# Patient Record
Sex: MALE | Race: WHITE | NOT HISPANIC OR LATINO | Employment: FULL TIME | URBAN - METROPOLITAN AREA
[De-identification: names, ages, dates, MRNs, and addresses within clinical notes are randomized per-mention and may not be internally consistent; named-entity substitution may affect disease eponyms.]

---

## 2017-09-22 ENCOUNTER — NON-PROVIDER VISIT (OUTPATIENT)
Dept: OCCUPATIONAL MEDICINE | Facility: CLINIC | Age: 22
End: 2017-09-22

## 2017-09-22 DIAGNOSIS — Z02.1 DRUG SCREENING, PRE-EMPLOYMENT: ICD-10-CM

## 2017-09-22 PROCEDURE — 8907 PR URINE COLLECT ONLY: Performed by: PREVENTIVE MEDICINE

## 2017-09-25 ENCOUNTER — NON-PROVIDER VISIT (OUTPATIENT)
Dept: OCCUPATIONAL MEDICINE | Facility: CLINIC | Age: 22
End: 2017-09-25

## 2017-09-25 PROCEDURE — 8907 PR URINE COLLECT ONLY: Performed by: PREVENTIVE MEDICINE

## 2022-08-11 ENCOUNTER — APPOINTMENT (OUTPATIENT)
Dept: RADIOLOGY | Facility: MEDICAL CENTER | Age: 27
End: 2022-08-11
Attending: EMERGENCY MEDICINE

## 2022-08-11 ENCOUNTER — HOSPITAL ENCOUNTER (EMERGENCY)
Facility: MEDICAL CENTER | Age: 27
End: 2022-08-11
Attending: EMERGENCY MEDICINE

## 2022-08-11 VITALS
OXYGEN SATURATION: 96 % | RESPIRATION RATE: 16 BRPM | DIASTOLIC BLOOD PRESSURE: 71 MMHG | BODY MASS INDEX: 29.41 KG/M2 | TEMPERATURE: 98.4 F | WEIGHT: 254.19 LBS | SYSTOLIC BLOOD PRESSURE: 129 MMHG | HEIGHT: 78 IN | HEART RATE: 65 BPM

## 2022-08-11 DIAGNOSIS — R06.00 DYSPNEA, UNSPECIFIED TYPE: ICD-10-CM

## 2022-08-11 DIAGNOSIS — R07.9 CHEST PAIN, UNSPECIFIED TYPE: ICD-10-CM

## 2022-08-11 LAB
ALBUMIN SERPL BCP-MCNC: 4.7 G/DL (ref 3.2–4.9)
ALBUMIN/GLOB SERPL: 2 G/DL
ALP SERPL-CCNC: 60 U/L (ref 30–99)
ALT SERPL-CCNC: 46 U/L (ref 2–50)
ANION GAP SERPL CALC-SCNC: 12 MMOL/L (ref 7–16)
AST SERPL-CCNC: 30 U/L (ref 12–45)
BASOPHILS # BLD AUTO: 0.4 % (ref 0–1.8)
BASOPHILS # BLD: 0.03 K/UL (ref 0–0.12)
BILIRUB SERPL-MCNC: 1.8 MG/DL (ref 0.1–1.5)
BUN SERPL-MCNC: 15 MG/DL (ref 8–22)
CALCIUM SERPL-MCNC: 9.6 MG/DL (ref 8.5–10.5)
CHLORIDE SERPL-SCNC: 103 MMOL/L (ref 96–112)
CO2 SERPL-SCNC: 21 MMOL/L (ref 20–33)
CREAT SERPL-MCNC: 0.85 MG/DL (ref 0.5–1.4)
EKG IMPRESSION: NORMAL
EOSINOPHIL # BLD AUTO: 0.78 K/UL (ref 0–0.51)
EOSINOPHIL NFR BLD: 10.9 % (ref 0–6.9)
ERYTHROCYTE [DISTWIDTH] IN BLOOD BY AUTOMATED COUNT: 41.1 FL (ref 35.9–50)
GFR SERPLBLD CREATININE-BSD FMLA CKD-EPI: 122 ML/MIN/1.73 M 2
GLOBULIN SER CALC-MCNC: 2.4 G/DL (ref 1.9–3.5)
GLUCOSE BLD STRIP.AUTO-MCNC: 111 MG/DL (ref 65–99)
GLUCOSE SERPL-MCNC: 99 MG/DL (ref 65–99)
HCT VFR BLD AUTO: 47 % (ref 42–52)
HGB BLD-MCNC: 16.3 G/DL (ref 14–18)
IMM GRANULOCYTES # BLD AUTO: 0.03 K/UL (ref 0–0.11)
IMM GRANULOCYTES NFR BLD AUTO: 0.4 % (ref 0–0.9)
LYMPHOCYTES # BLD AUTO: 1.97 K/UL (ref 1–4.8)
LYMPHOCYTES NFR BLD: 27.6 % (ref 22–41)
MCH RBC QN AUTO: 31.4 PG (ref 27–33)
MCHC RBC AUTO-ENTMCNC: 34.7 G/DL (ref 33.7–35.3)
MCV RBC AUTO: 90.6 FL (ref 81.4–97.8)
MONOCYTES # BLD AUTO: 0.75 K/UL (ref 0–0.85)
MONOCYTES NFR BLD AUTO: 10.5 % (ref 0–13.4)
NEUTROPHILS # BLD AUTO: 3.57 K/UL (ref 1.82–7.42)
NEUTROPHILS NFR BLD: 50.2 % (ref 44–72)
NRBC # BLD AUTO: 0 K/UL
NRBC BLD-RTO: 0 /100 WBC
PLATELET # BLD AUTO: 202 K/UL (ref 164–446)
PMV BLD AUTO: 10.5 FL (ref 9–12.9)
POTASSIUM SERPL-SCNC: 4.2 MMOL/L (ref 3.6–5.5)
PROT SERPL-MCNC: 7.1 G/DL (ref 6–8.2)
RBC # BLD AUTO: 5.19 M/UL (ref 4.7–6.1)
SODIUM SERPL-SCNC: 136 MMOL/L (ref 135–145)
TROPONIN T SERPL-MCNC: <6 NG/L (ref 6–19)
WBC # BLD AUTO: 7.1 K/UL (ref 4.8–10.8)

## 2022-08-11 PROCEDURE — 85025 COMPLETE CBC W/AUTO DIFF WBC: CPT

## 2022-08-11 PROCEDURE — 80053 COMPREHEN METABOLIC PANEL: CPT

## 2022-08-11 PROCEDURE — 71045 X-RAY EXAM CHEST 1 VIEW: CPT

## 2022-08-11 PROCEDURE — 99283 EMERGENCY DEPT VISIT LOW MDM: CPT

## 2022-08-11 PROCEDURE — 93005 ELECTROCARDIOGRAM TRACING: CPT | Performed by: EMERGENCY MEDICINE

## 2022-08-11 PROCEDURE — 93005 ELECTROCARDIOGRAM TRACING: CPT

## 2022-08-11 PROCEDURE — 84484 ASSAY OF TROPONIN QUANT: CPT

## 2022-08-11 PROCEDURE — 82962 GLUCOSE BLOOD TEST: CPT

## 2022-08-11 PROCEDURE — 36415 COLL VENOUS BLD VENIPUNCTURE: CPT

## 2022-08-11 NOTE — ED NOTES
Discharge instructions verbalized to pt. Pt verbalized understanding. Pt discharged ambulatory. Questions answered. No further concerns at this time.

## 2022-08-11 NOTE — ED TRIAGE NOTES
Konrad Carrillo  27 y.o. male  Chief Complaint   Patient presents with    Shortness of Breath     Restless sleep x 1 week. Last night unable to fall asleep due to SOB with dizziness and panicked feeling every time patient was about to fall asleep. Seen for similar episode 12/26/2022. Pulmonology referral sent for possible MILA. Patient did not follow up with pulmonology.   Patient has been feeling tightness in his chest with dizziness, then the need to take a deep breath. NIH 0, GCS 15, FSBG 111, EKG performed.        Pt ambulatory to triage with steady gait for above complaint. Patient reports he took a hydroxyzine this am that was supplied by a friend because he thought he was having a panic attack.     Pt is GCS 15, speaking in full sentences, follows commands and responds appropriately to questions. Resp are even and unlabored. Patient denies pain.     CP protocol ordered. Pt placed in draw room. Pt educated on triage process. Pt encouraged to alert staff for any changes.     This RN masked and in appropriate PPE during encounter.     Vitals:    08/11/22 0706   BP: 130/80   Pulse: 80   Resp: 18   Temp: 36.9 °C (98.4 °F)   SpO2: 97%

## 2022-08-11 NOTE — ED PROVIDER NOTES
ED Provider Note    CHIEF COMPLAINT  Chief Complaint   Patient presents with    Shortness of Breath     Restless sleep x 1 week. Last night unable to fall asleep due to SOB with dizziness and panicked feeling every time patient was about to fall asleep. Seen for similar episode 12/26/2022. Pulmonology referral sent for possible MILA. Patient did not follow up with pulmonology.   Patient has been feeling tightness in his chest with dizziness, then the need to take a deep breath. NIH 0, GCS 15, FSBG 111, EKG performed.        XENIA Carrillo is a 27 y.o. male who presents to the emergency department with complaint of shortness of breath.  The patient states that he woke up extremely early this morning or even late last night with profound difficulty breathing and restless sleep.  He has had increasing restless sleep for proxy 1 week and feels very lightheaded and dizzy as he has had sleep deprivation.  He states when he almost goes asleep he wakes up gasping for air and has had very fitful sleep pattern.  This happened approximately 7 months ago was seen at [facility] and he was supposed to follow-up with pulmonology for possible obstructive sleep apnea.  He also endorses the fact that when he wakes up gasping for air he has slight chest pressure and cramping in his chest.  It is very short-lived and happen approximately 8 hours ago.    Cardiac Risk Factors:  No Age > 55  No Aspirin use within 7 days  No prior history of coronary artery disease  No diabetes  No hyperlipidemia/chloesterol   No hypertension  No obesity  No family history of coronary artery disease at a young age <56 yo  No tobacco use   No drugs (methamphetamine or cocaine)  No history of aortic aneurysm   No history of aortic dissection   No history of deep vein thrombosis or pulmonary embolism         REVIEW OF SYSTEMS  Positives as above. Pertinent negatives include fever, shakes, chills, sweats, nausea, vomitin  All  "other 10 review of systems are negative    PAST MEDICAL HISTORY  History reviewed. No pertinent past medical history.    FAMILY HISTORY  Noncontributory    SOCIAL HISTORY  Social History     Socioeconomic History    Marital status: Single   Tobacco Use    Smoking status: Never    Smokeless tobacco: Current     Types: Chew   Vaping Use    Vaping Use: Never used   Substance and Sexual Activity    Alcohol use: Yes     Alcohol/week: 7.2 oz     Types: 12 Cans of beer per week    Drug use: Never       SURGICAL HISTORY  History reviewed. No pertinent surgical history.    CURRENT MEDICATIONS  Home Medications       Reviewed by Erick Bhatt R.N. (Registered Nurse) on 08/11/22 at 0729  Med List Status: Partial     Medication Last Dose Status        Patient Bonifacio Taking any Medications                           ALLERGIES  No Known Allergies    PHYSICAL EXAM  VITAL SIGNS: /71   Pulse 65   Temp 36.9 °C (98.4 °F)   Resp 16   Ht 1.981 m (6' 6\")   Wt 115 kg (254 lb 3.1 oz)   SpO2 96%   BMI 29.37 kg/m²    Constitutional: Well developed, Well nourished, No acute distress, Non-toxic appearance.   Eyes: PERRLA, EOMI, Conjunctiva normal, No discharge.   Cardiovascular: Normal heart rate, Normal rhythm, No murmurs, No rubs, No gallops, no JVD Thorax & Lungs:  No respiratory distress, no rales, no rhonchi, No wheezing, No chest wall tenderness.   Abdomen: Bowel sounds normal, Soft, No tenderness, No guarding, No rebound, No pulsatile masses.   Skin: Warm, Dry, No erythema, No rash.   Extremities: Full range of motion, no deformity, no edema.  Neurologic: Alert & oriented x 3, No focal deficits noted, acting appropriately on exam.  Psychiatric: Affect normal for clinical presentation.      LABORATORY/ECG  Results for orders placed or performed during the hospital encounter of 08/11/22   CBC with Differential   Result Value Ref Range    WBC 7.1 4.8 - 10.8 K/uL    RBC 5.19 4.70 - 6.10 M/uL    Hemoglobin 16.3 14.0 - 18.0 g/dL "    Hematocrit 47.0 42.0 - 52.0 %    MCV 90.6 81.4 - 97.8 fL    MCH 31.4 27.0 - 33.0 pg    MCHC 34.7 33.7 - 35.3 g/dL    RDW 41.1 35.9 - 50.0 fL    Platelet Count 202 164 - 446 K/uL    MPV 10.5 9.0 - 12.9 fL    Neutrophils-Polys 50.20 44.00 - 72.00 %    Lymphocytes 27.60 22.00 - 41.00 %    Monocytes 10.50 0.00 - 13.40 %    Eosinophils 10.90 (H) 0.00 - 6.90 %    Basophils 0.40 0.00 - 1.80 %    Immature Granulocytes 0.40 0.00 - 0.90 %    Nucleated RBC 0.00 /100 WBC    Neutrophils (Absolute) 3.57 1.82 - 7.42 K/uL    Lymphs (Absolute) 1.97 1.00 - 4.80 K/uL    Monos (Absolute) 0.75 0.00 - 0.85 K/uL    Eos (Absolute) 0.78 (H) 0.00 - 0.51 K/uL    Baso (Absolute) 0.03 0.00 - 0.12 K/uL    Immature Granulocytes (abs) 0.03 0.00 - 0.11 K/uL    NRBC (Absolute) 0.00 K/uL   Complete Metabolic Panel (CMP)   Result Value Ref Range    Sodium 136 135 - 145 mmol/L    Potassium 4.2 3.6 - 5.5 mmol/L    Chloride 103 96 - 112 mmol/L    Co2 21 20 - 33 mmol/L    Anion Gap 12.0 7.0 - 16.0    Glucose 99 65 - 99 mg/dL    Bun 15 8 - 22 mg/dL    Creatinine 0.85 0.50 - 1.40 mg/dL    Calcium 9.6 8.5 - 10.5 mg/dL    AST(SGOT) 30 12 - 45 U/L    ALT(SGPT) 46 2 - 50 U/L    Alkaline Phosphatase 60 30 - 99 U/L    Total Bilirubin 1.8 (H) 0.1 - 1.5 mg/dL    Albumin 4.7 3.2 - 4.9 g/dL    Total Protein 7.1 6.0 - 8.2 g/dL    Globulin 2.4 1.9 - 3.5 g/dL    A-G Ratio 2.0 g/dL   Troponin   Result Value Ref Range    Troponin T <6 6 - 19 ng/L   ESTIMATED GFR   Result Value Ref Range    GFR (CKD-EPI) 122 >60 mL/min/1.73 m 2   EKG   Result Value Ref Range    Report       [facility]    Test Date:  2022  Pt Name:    RUTH NATHANAEL        Department: ER  MRN:        [id number]                      Room:  Gender:     Male                         Technician: [id number]  :        1995                   Requested By:ER TRIAGE PROTOCOL  Order #:    [id number]                    Reading MD:    Measurements  Intervals                                 Axis  Rate:       73                           P:          -13  ID:         [id number]                          QRS:        44  QRSD:       86                           T:          -8  QT:         376  QTc:        415    Interpretive Statements  SINUS RHYTHM  BORDERLINE T ABNORMALITIES, INFERIOR LEADS  No previous ECG available for comparison           RADIOLOGY/PROCEDURES  DX-CHEST-PORTABLE (1 VIEW)   Final Result      No acute cardiopulmonary process is seen.            COURSE & MEDICAL DECISION MAKING  Pertinent Labs & Imaging studies reviewed. (See chart for details)  This is a 27-year-old male presents with shortness of breath and chest pain.  As for the shortness of breath, x-ray is negative for pneumonia, pneumothorax, any type of pulmonary abnormality.  I do suspect the patient probably is suffering from sleep apnea and have asked him to follow primary care physician for possible pulmonary consult.      ACS is less likely given atypical nature of pain, ECG with no ischemic changes, negative troponin , HEART score of 0 and patient lack of major cardiac risk factors. Given these factors as well as patient age, risk of 30 day to 6 month mortality or ACS is low based on multiple studies. This was discussed with patient and need for follow up was emphasized. PE is less likely given nature of pain not consistent with PE and PERC negative. Dissection, aneurysm and pneumothorax are unlikely given the pain is resolved, as well as the nature of the pain and lack of risk factors, such as smoking, as well as Xray lacking evidence of either. Pneumonia or other infection is less likely given no fever or infectious symptoms as well as no radiographic evidence. Other non emergent causes such as costochondritis, muscle strain,GI causes were also considered    I discussed strict return precautions with pt including shortness of breath, new/persistent/worse pain, syncope, vomiting, fever, palpitations, abdominal  pain or any other concerns. Pt understood these well and was instructed to follow up with PCP.       FINAL IMPRESSION     1. Dyspnea, unspecified type    2. Chest pain, unspecified type        DISPOSITION:  Patient will be discharged home in stable condition.    FOLLOW UP:  [facility], Emergency Dept  [address] [facility] [id number] [phone]    If symptoms worsen    [address] [address]  [phone]  Schedule an appointment as soon as possible for a visit         Electronically signed by: Monty Jones D.O., 8/11/2022 7:59 AM

## 2022-08-11 NOTE — DISCHARGE INSTRUCTIONS
Your tests today show no signs of heart attack or other dangerous condition.  However sometimes these can develop even with normal tests.  Please follow-up as directed below, seek more immediate medical attention for any new chest pain, difficulty breathing, passing out or any other concerns.     It is possible that you have sleep apnea.  Asked that you follow-up with a primary care physician for further evaluation of possible pulmonology consult.

## 2024-05-20 ENCOUNTER — HOSPITAL ENCOUNTER (EMERGENCY)
Facility: MEDICAL CENTER | Age: 29
End: 2024-05-20
Attending: EMERGENCY MEDICINE

## 2024-05-20 ENCOUNTER — APPOINTMENT (OUTPATIENT)
Dept: RADIOLOGY | Facility: MEDICAL CENTER | Age: 29
End: 2024-05-20
Attending: EMERGENCY MEDICINE

## 2024-05-20 VITALS
WEIGHT: 255 LBS | RESPIRATION RATE: 16 BRPM | DIASTOLIC BLOOD PRESSURE: 74 MMHG | OXYGEN SATURATION: 98 % | BODY MASS INDEX: 29.5 KG/M2 | HEIGHT: 78 IN | TEMPERATURE: 98 F | HEART RATE: 98 BPM | SYSTOLIC BLOOD PRESSURE: 121 MMHG

## 2024-05-20 DIAGNOSIS — T14.8XXA AVULSION FRACTURE: ICD-10-CM

## 2024-05-20 RX ORDER — HYDROCODONE BITARTRATE AND ACETAMINOPHEN 5; 325 MG/1; MG/1
1 TABLET ORAL EVERY 6 HOURS PRN
Qty: 15 TABLET | Refills: 0 | Status: SHIPPED | OUTPATIENT
Start: 2024-05-20 | End: 2024-05-23

## 2024-05-20 ASSESSMENT — FIBROSIS 4 INDEX: FIB4 SCORE: 0.64

## 2024-05-20 NOTE — ED PROVIDER NOTES
"ED Provider Note    CHIEF COMPLAINT  Chief Complaint   Patient presents with    Knee Injury     Pt injured L knee on Friday.  H/O knee injury and prior dislocations.         EXTERNAL RECORDS REVIEWED  Outpatient Notes   [facility]    HPI/ROS  LIMITATION TO HISTORY   None  OUTSIDE HISTORIAN(S):  Significant other.  States patient has history of left knee issues    Konrad Rosado is a 29 y.o. male who presents here for evaluation of left knee pain.  Patient states he twisted the left knee when he was playing with his dogs on Friday, he has been having persistent pain since that time.  He states he has had use crutches, cannot place any weight directly on the knee.  He has no ankle or hip pain.  He denies any fall or trauma.  No erythema or warmth to the knee.    PAST MEDICAL HISTORY   No bleeding disorders    SURGICAL HISTORY  patient denies any surgical history    FAMILY HISTORY  History reviewed. No pertinent family history.    SOCIAL HISTORY  Social History     Tobacco Use    Smoking status: Never    Smokeless tobacco: Current     Types: Chew   Vaping Use    Vaping status: Never Used   Substance and Sexual Activity    Alcohol use: Yes     Alcohol/week: 7.2 oz     Types: 12 Cans of beer per week    Drug use: Never    Sexual activity: Not on file       CURRENT MEDICATIONS  Home Medications       Reviewed by Letty Waters R.N. (Registered Nurse) on 05/20/24 at 1039  Med List Status: Not Addressed     Medication Last Dose Status        Patient Bonifacio Taking any Medications                         Audit from Redirected Encounters    **Home medications have not yet been reviewed for this encounter**         ALLERGIES  No Known Allergies    PHYSICAL EXAM  VITAL SIGNS: /79   Pulse (!) 114   Temp 36.6 °C (97.9 °F) (Temporal)   Resp 18   Ht 1.981 m (6' 6\")   Wt 116 kg (255 lb)   SpO2 97%   BMI 29.47 kg/m²    Constitutional: Well developed, well nourished. No acute distress.  HEENT: " Normocephalic, atraumatic. Posterior pharynx clear and moist.  Eyes:  EOMI. Normal sclera.  Neck: Supple, Full range of motion, nontender.  Chest/Pulmonary: clear to ausculation. Symmetrical expansion.   Musculoskeletal: No deformity, no edema, neurovascular intact.  Tenderness to the medial aspect of the left knee, good range of motion, no erythema.  Neurovascular tact distally.  Nontender left ankle, nontender left hip.  Neuro: Clear speech, appropriate, cooperative, cranial nerves II-XII grossly intact.  Psych: Normal mood and affect      EKG/LABS  None    RADIOLOGY/PROCEDURES   I have independently interpreted the diagnostic imaging associated with this visit and am waiting the final reading from the radiologist.   My preliminary interpretation is as follows: See below    Radiologist interpretation:  DX-KNEE COMPLETE 4+ LEFT   Final Result      1. Displaced avulsion fracture fragment in the inferior aspect of the left patellofemoral knee joint compartment with large left knee joint effusion.   2. MRI left knee without IV contrast is recommended for further evaluation.   3. Anterior left knee soft tissue swelling.          COURSE & MEDICAL DECISION MAKING    ASSESSMENT, COURSE AND PLAN  Care Narrative: This is a 29-year-old male here for evaluation of left knee pain.  I spoke to Dr. Betancourt, who states the pt will need an outpatient MRI and sports follow up.  He said he will forward the information to the pt on where to go from here.  He reviewed the films as well.  Pt has good rom on evaluation. Pt aware of importance of follow up and MRI       DISPOSITION AND DISCUSSIONS  I have discussed management of the patient with the following physicians and SOCORRO's: Ortho    Discussion of management with other QHP or appropriate source(s): None    Escalation of care considered, and ultimately not performed: No IV fluids indicated    Barriers to care at this time, including but not limited to: Patient does not have  established PCP.     Decision tools and prescription drugs considered including, but not limited to: Motrin.    FINAL DIAGNOSIS  1. Avulsion fracture           Electronically signed by: Brock Villa D.O., 5/20/2024 11:26 AM

## 2024-05-20 NOTE — ED TRIAGE NOTES
Chief Complaint   Patient presents with    Knee Injury     Pt injured L knee on Friday.  H/O knee injury and prior dislocations.       PT arrives in knee brace and on crutches that he had at home.  R knee is swollen.

## 2024-05-20 NOTE — ED NOTES
Pt's personal knee brace is capable of locking flexion/extension. Per ERP this is sufficient splinting, and additional knee immobilizer distribution is not required.

## 2024-05-20 NOTE — ED NOTES
Pt ambulated to the room using personal crutches, refused wheelchair transport. Agree with triage note. No further complaints at this time. Chart up for ERP.

## 2024-05-20 NOTE — ED NOTES
Pt given discharge instructions/prescription sent to pharm of choosing/ home care instructions explained, pt verbalized understanding of instructions given/pt understands the importance of follow up, pt ambulatory to ER [facility].

## 2024-05-20 NOTE — Clinical Note
Konrad Benjaminirvingross was seen and treated in our emergency department on 5/20/2024.  He may return to work on 05/22/2024.       If you have any questions or concerns, please don't hesitate to call.      Brock Villa D.O.

## 2025-03-26 ENCOUNTER — OFFICE VISIT (OUTPATIENT)
Dept: URGENT CARE | Facility: PHYSICIAN GROUP | Age: 30
End: 2025-03-26
Payer: COMMERCIAL

## 2025-03-26 VITALS
BODY MASS INDEX: 30.89 KG/M2 | SYSTOLIC BLOOD PRESSURE: 110 MMHG | WEIGHT: 267 LBS | HEIGHT: 78 IN | OXYGEN SATURATION: 98 % | RESPIRATION RATE: 16 BRPM | HEART RATE: 79 BPM | DIASTOLIC BLOOD PRESSURE: 72 MMHG | TEMPERATURE: 98.6 F

## 2025-03-26 DIAGNOSIS — B08.4 HAND, FOOT AND MOUTH DISEASE: ICD-10-CM

## 2025-03-26 DIAGNOSIS — R21 RASH: ICD-10-CM

## 2025-03-26 RX ORDER — PREDNISONE 20 MG/1
20 TABLET ORAL 2 TIMES DAILY
Qty: 10 TABLET | Refills: 0 | Status: SHIPPED | OUTPATIENT
Start: 2025-03-26 | End: 2025-03-31

## 2025-03-29 NOTE — PROGRESS NOTES
"Subjective     Konradronald Rosado is a 30 y.o. male who presents with Blister (X2 days started having blisters on both hands and on hairline. )            Patient presents with:  Blister: X2 days started having blisters on both hands and on hairline.  PT's kids have HFM , thinks he may have the same but wants to make sure. Pt denies fever, chills, cough or any other complaint.     Blister  Associated symptoms include a rash.       Review of Systems   Skin:  Positive for rash. Negative for itching.   All other systems reviewed and are negative.             Objective     /72   Pulse 79   Temp 37 °C (98.6 °F) (Temporal)   Resp 16   Ht 1.981 m (6' 6\")   Wt 121 kg (267 lb)   SpO2 98%   BMI 30.85 kg/m²      Physical Exam  Vitals and nursing note reviewed.   Constitutional:       General: He is not in acute distress.     Appearance: Normal appearance. He is well-developed.   HENT:      Head: Normocephalic and atraumatic.      Nose: Nose normal.      Mouth/Throat:      Mouth: Mucous membranes are moist.   Eyes:      Extraocular Movements: Extraocular movements intact.      Conjunctiva/sclera: Conjunctivae normal.      Pupils: Pupils are equal, round, and reactive to light.   Cardiovascular:      Rate and Rhythm: Normal rate and regular rhythm.      Heart sounds: Normal heart sounds.   Pulmonary:      Effort: Pulmonary effort is normal.      Breath sounds: Normal breath sounds.   Abdominal:      Palpations: Abdomen is soft.   Musculoskeletal:         General: Normal range of motion.      Cervical back: Normal range of motion.   Skin:     General: Skin is warm and dry.      Capillary Refill: Capillary refill takes less than 2 seconds.      Findings: Rash present. Rash is vesicular.          Neurological:      Mental Status: He is alert and oriented to person, place, and time.      Coordination: Coordination normal.      Gait: Gait normal.   Psychiatric:         Mood and Affect: Mood normal.             "                      Assessment & Plan  Hand, foot and mouth disease    Orders:    predniSONE (DELTASONE) 20 MG Tab; Take 1 Tablet by mouth 2 times a day for 5 days.    Rash    Orders:    predniSONE (DELTASONE) 20 MG Tab; Take 1 Tablet by mouth 2 times a day for 5 days.             Patient HPI physical exam consistent with hand-foot-and-mouth rash to hands, feet.  I have sent a prescription for prednisone for inflammation as it is quite painful.  Ibuprofen has not very helpful.    Differential diagnosis, supportive care, and indications for immediate follow-up discussed with patient.  Instructed to return to clinic or nearest emergency department for any change in condition, further concerns, or worsening of symptoms.    I personally reviewed prior external notes and test results pertinent to today's visit.  I have independently reviewed and interpreted all diagnostics ordered during this urgent care visit.    PT should follow up with PCP in 1-2 days for re-evaluation if symptoms have not improved.      Discussed red flags and reasons to return to UC or ED.      Pt and/or family verbalized understanding of diagnosis and follow up instructions and was offered informational handout on diagnosis.  PT discharged.     Please note that this dictation was created using voice recognition software. I have made every reasonable attempt to correct obvious errors, but I expect that there may be errors of grammar and possibly content that I did not discover before finalizing the note.